# Patient Record
Sex: FEMALE | Race: WHITE | NOT HISPANIC OR LATINO | Employment: OTHER | URBAN - METROPOLITAN AREA
[De-identification: names, ages, dates, MRNs, and addresses within clinical notes are randomized per-mention and may not be internally consistent; named-entity substitution may affect disease eponyms.]

---

## 2023-07-11 ENCOUNTER — NURSING HOME VISIT (OUTPATIENT)
Dept: POST ACUTE CARE | Facility: EXTERNAL LOCATION | Age: 88
End: 2023-07-11
Payer: MEDICARE

## 2023-07-11 DIAGNOSIS — N17.9 AKI (ACUTE KIDNEY INJURY) (CMS-HCC): Primary | ICD-10-CM

## 2023-07-11 DIAGNOSIS — D64.9 ANEMIA, UNSPECIFIED TYPE: ICD-10-CM

## 2023-07-11 DIAGNOSIS — R65.20 SEPSIS WITH METABOLIC ENCEPHALOPATHY (MULTI): ICD-10-CM

## 2023-07-11 DIAGNOSIS — A41.9 SEPSIS WITH METABOLIC ENCEPHALOPATHY (MULTI): ICD-10-CM

## 2023-07-11 DIAGNOSIS — L03.90 CELLULITIS, UNSPECIFIED CELLULITIS SITE: ICD-10-CM

## 2023-07-11 DIAGNOSIS — K92.2 GASTROINTESTINAL HEMORRHAGE, UNSPECIFIED GASTROINTESTINAL HEMORRHAGE TYPE: ICD-10-CM

## 2023-07-11 DIAGNOSIS — G93.41 SEPSIS WITH METABOLIC ENCEPHALOPATHY (MULTI): ICD-10-CM

## 2023-07-11 PROCEDURE — 99497 ADVNCD CARE PLAN 30 MIN: CPT | Performed by: EMERGENCY MEDICINE

## 2023-07-11 PROCEDURE — 99305 1ST NF CARE MODERATE MDM 35: CPT | Performed by: EMERGENCY MEDICINE

## 2023-07-11 NOTE — LETTER
Patient: Yana Gasca  : 1934    Encounter Date: 2023    Yana Gasca   88 y.o.  female  @location@            Assessment and Plan:  History and physical  Acute kidney injury - PYW52-AL N17.9, Medical.     Anemia - PTT42-JW D64.9, Medical.     Cancer - HUG69-QB C80.1, Medical.     Cellulitis of left lower extremity - EPC03-UT L03.116, Medical.     Decreased pedal pulses - LFX33-DQ R09.89, Medical.     GI bleed - FLB43-DC K92.2, Medical.     Hypertension - NCC04-EQ I10, Medical.     IBS - IYU22-AB K58.9, Patient/Family Stated.     Lung nodule - WIR64-JK R91.1, Medical.     Septic shock - IEX88-PL R65.21, Medical.     Severe sepsis - MLK38-VZ R65.20, Medical.       Medications (11) Active  acetaminophen-oxycodone 325 mg-5 mg oral tablet = Percocet 1 tabs, PRN, ORAL, [id number]  calcium (as calcium citrate) 250 mg oral tablet 500 mg = 2 tabs, ORAL, BID  DAPTOmycin 350 mg intravenous injection See Instructions  lactobacillus acidophilus = Floranex, Florajen, Lactinex Granules 1 caps, ORAL, DAILY  Lipitor 10 mg oral tablet 10 mg = 1 tabs, ORAL, DAILY  Pepcid 20 mg oral tablet 20 mg = 1 tabs, PRN, ORAL, DAILY  Plaquenil 200 mg oral tablet 300 mg = 1.5 tabs, ORAL, DAILY  Synthroid 112 mcg (0.112 mg) oral tablet 112 mcg = 1 tabs, ORAL, DAILY  Vitamin D3 125 mcg (5000 intl units) oral tablet 125 mcg = 1 tabs, ORAL, DAILY  warfarin 2 mg oral tablet 2 mg = 1 tabs, ORAL, QHS  Zosyn 2 g-0.25 g/50 mL intravenous solution 2.25 g, IV, [id number]    I discussed advanced care planning including the explanation and discussion of advanced directives. If patient does not have current up to date documents, examples and information provided on how to create both living will and power of . Patient was encouraged to work on completing these documents.  Information and advise was also provided on DO NOT RESUSCITATE and patient encouraged to consider this  Patient is not sure about DNR at this time    Source of history:  Nurse, Medical personnel, Medical record, Patient.  History limitation: None.    HPI:  History and physical    Patient is unable to give any detailed history and therefore history is obtained from the chart  No acute complaints voiced by the patient or acute concerns raised by nursing    History of hospitalization-    CHIEF COMPLAINT:  Chronic back pain.    HISTORY OF PRESENT ILLNESS:  This is an 88-year-old white female with  past medical history of thyroid cancer who now has hypothyroidism,  hypertension, chronic back pain with TENS unit, paroxysmal atrial  fibrillation on Coumadin, hyperlipidemia, came to the emergency room  from an assisted living facility with complaint of back pain.  She was  found to have a large wound of her left lower extremity which she states  used to be a blister and popped over the last couple of days.  The  blister started a few weeks ago.  She is unsure where she got the wound.  From her leg is red and hard to the touch.  She does complain of pain in  bilateral lower extremities.  Right leg is wrapped in Ace wrap.  She  states she does not have any wounds or warmth, redness of that lower  extremity.  She does have a history of DVT several years ago.  She was  hypotensive in the emergency department, was given fluids, remained  hypotensive, and was started on Levophed.  She was also given broad-  spectrum antibiotics, and blood cultures were drawn.  She appears to  have a slight bump in creatinine, and she denies any history of kidney  disease.  She also denies any chest pain or abdominal pain or any  changes in her urine.  She is incontinent and does wear briefs.  She  also denies fever, chills, or muscle aches and pains.  Denies numbness  or tingling in either extremities.  States that her left lower extremity  hurts worse than her back pain.  Also, her stools have been dark over  the last several days, and she thought that was secondary to eating  large amounts of chocolate.  She is  not taking iron at home.  Denies any  alcohol use.  Takes Coumadin and naproxen intermittently.    ALLERGIES:  NO KNOWN ALLERGIES.    No current outpatient medications on file.     No current facility-administered medications for this visit.       Physical Exam:  Vital signs as per nursing/MA documentation were reviewed  General appearance: Alert and in no acute distress  HEENT: Normal Inspection  Neck - Normal Inspection  Respiratory : No respiratory distress. Lungs are clear   Cardiovascular: heart rate normal. No gallop  Back - normal inspection  Skin inspection:Warm  Musculoskeletal : No deformities  Neuro : Limited exam. Baseline    ROS: Review of symptoms is negative except for what is mentioned in HPI    Results/Data  Records including but not limited to electronic medical records, relevant lab work and imaging from patient's health care facility encounter were reviewed and independently verified      Charting was completed using voice recognition technology and may include unintended errors.    Discussed with patient/family, RN, and NP.      Electronically Signed By: Marcin Hurd MD   7/13/23  4:15 PM

## 2023-07-13 NOTE — PROGRESS NOTES
Yana CHRISTIANSON Campos   88 y.o.  female  @location@            Assessment and Plan:  History and physical  Acute kidney injury - GDC93-US N17.9, Medical.     Anemia - WIL26-KH D64.9, Medical.     Cancer - MNP87-BY C80.1, Medical.     Cellulitis of left lower extremity - SBV22-NK L03.116, Medical.     Decreased pedal pulses - VDI15-PT R09.89, Medical.     GI bleed - WTC57-DL K92.2, Medical.     Hypertension - ADF12-TZ I10, Medical.     IBS - JND89-QL K58.9, Patient/Family Stated.     Lung nodule - VPZ03-GK R91.1, Medical.     Septic shock - YIG95-TD R65.21, Medical.     Severe sepsis - TOE97-KH R65.20, Medical.       Medications (11) Active  acetaminophen-oxycodone 325 mg-5 mg oral tablet = Percocet 1 tabs, PRN, ORAL, [id number]  calcium (as calcium citrate) 250 mg oral tablet 500 mg = 2 tabs, ORAL, BID  DAPTOmycin 350 mg intravenous injection See Instructions  lactobacillus acidophilus = Floranex, Florajen, Lactinex Granules 1 caps, ORAL, DAILY  Lipitor 10 mg oral tablet 10 mg = 1 tabs, ORAL, DAILY  Pepcid 20 mg oral tablet 20 mg = 1 tabs, PRN, ORAL, DAILY  Plaquenil 200 mg oral tablet 300 mg = 1.5 tabs, ORAL, DAILY  Synthroid 112 mcg (0.112 mg) oral tablet 112 mcg = 1 tabs, ORAL, DAILY  Vitamin D3 125 mcg (5000 intl units) oral tablet 125 mcg = 1 tabs, ORAL, DAILY  warfarin 2 mg oral tablet 2 mg = 1 tabs, ORAL, QHS  Zosyn 2 g-0.25 g/50 mL intravenous solution 2.25 g, IV, [id number]    I discussed advanced care planning including the explanation and discussion of advanced directives. If patient does not have current up to date documents, examples and information provided on how to create both living will and power of . Patient was encouraged to work on completing these documents.  Information and advise was also provided on DO NOT RESUSCITATE and patient encouraged to consider this  Patient is not sure about DNR at this time    Source of history: Nurse, Medical personnel, Medical record, Patient.  History limitation:  None.    HPI:  History and physical    Patient is unable to give any detailed history and therefore history is obtained from the chart  No acute complaints voiced by the patient or acute concerns raised by nursing    History of hospitalization-    CHIEF COMPLAINT:  Chronic back pain.    HISTORY OF PRESENT ILLNESS:  This is an 88-year-old white female with  past medical history of thyroid cancer who now has hypothyroidism,  hypertension, chronic back pain with TENS unit, paroxysmal atrial  fibrillation on Coumadin, hyperlipidemia, came to the emergency room  from an assisted living facility with complaint of back pain.  She was  found to have a large wound of her left lower extremity which she states  used to be a blister and popped over the last couple of days.  The  blister started a few weeks ago.  She is unsure where she got the wound.  From her leg is red and hard to the touch.  She does complain of pain in  bilateral lower extremities.  Right leg is wrapped in Ace wrap.  She  states she does not have any wounds or warmth, redness of that lower  extremity.  She does have a history of DVT several years ago.  She was  hypotensive in the emergency department, was given fluids, remained  hypotensive, and was started on Levophed.  She was also given broad-  spectrum antibiotics, and blood cultures were drawn.  She appears to  have a slight bump in creatinine, and she denies any history of kidney  disease.  She also denies any chest pain or abdominal pain or any  changes in her urine.  She is incontinent and does wear briefs.  She  also denies fever, chills, or muscle aches and pains.  Denies numbness  or tingling in either extremities.  States that her left lower extremity  hurts worse than her back pain.  Also, her stools have been dark over  the last several days, and she thought that was secondary to eating  large amounts of chocolate.  She is not taking iron at home.  Denies any  alcohol use.  Takes Coumadin and  naproxen intermittently.    ALLERGIES:  NO KNOWN ALLERGIES.    No current outpatient medications on file.     No current facility-administered medications for this visit.       Physical Exam:  Vital signs as per nursing/MA documentation were reviewed  General appearance: Alert and in no acute distress  HEENT: Normal Inspection  Neck - Normal Inspection  Respiratory : No respiratory distress. Lungs are clear   Cardiovascular: heart rate normal. No gallop  Back - normal inspection  Skin inspection:Warm  Musculoskeletal : No deformities  Neuro : Limited exam. Baseline    ROS: Review of symptoms is negative except for what is mentioned in HPI    Results/Data  Records including but not limited to electronic medical records, relevant lab work and imaging from patient's health care facility encounter were reviewed and independently verified      Charting was completed using voice recognition technology and may include unintended errors.    Discussed with patient/family, RN, and NP.

## 2024-01-20 PROCEDURE — 99233 SBSQ HOSP IP/OBS HIGH 50: CPT | Performed by: INTERNAL MEDICINE

## 2024-01-25 ENCOUNTER — NURSING HOME VISIT (OUTPATIENT)
Dept: POST ACUTE CARE | Facility: EXTERNAL LOCATION | Age: 89
End: 2024-01-25
Payer: MEDICARE

## 2024-01-25 DIAGNOSIS — I11.9 CARDIOMYOPATHY, HYPERTENSIVE, BENIGN, WITHOUT HEART FAILURE (MULTI): ICD-10-CM

## 2024-01-25 DIAGNOSIS — I43 CARDIOMYOPATHY, HYPERTENSIVE, BENIGN, WITHOUT HEART FAILURE (MULTI): ICD-10-CM

## 2024-01-25 DIAGNOSIS — M06.9 RHEUMATOID ARTHRITIS, INVOLVING UNSPECIFIED SITE, UNSPECIFIED WHETHER RHEUMATOID FACTOR PRESENT (MULTI): ICD-10-CM

## 2024-01-25 DIAGNOSIS — K92.2 GASTROINTESTINAL HEMORRHAGE, UNSPECIFIED GASTROINTESTINAL HEMORRHAGE TYPE: Primary | ICD-10-CM

## 2024-01-25 DIAGNOSIS — N18.30 STAGE 3 CHRONIC KIDNEY DISEASE, UNSPECIFIED WHETHER STAGE 3A OR 3B CKD (MULTI): ICD-10-CM

## 2024-01-25 DIAGNOSIS — I48.91 ATRIAL FIBRILLATION, UNSPECIFIED TYPE (MULTI): ICD-10-CM

## 2024-01-25 PROCEDURE — 99497 ADVNCD CARE PLAN 30 MIN: CPT | Performed by: EMERGENCY MEDICINE

## 2024-01-25 PROCEDURE — 99305 1ST NF CARE MODERATE MDM 35: CPT | Performed by: EMERGENCY MEDICINE

## 2024-01-25 NOTE — LETTER
Patient: Yana Gasca  : 1934    Encounter Date: 2024    Yana Gasca   89 y.o.  female  @location@            Assessment and Plan:  History and physical    89-year-old lady    Rectal bleeding  Anticoagulated  CKD  Atrial fibrillation  Hypothyroidism  Hypertension  Hyperlipidemia  Rheumatoid arthritis  Remote history of DVT      Patient underwent EGD with no active bleeding was found  Patient's Coumadin is on hold  INR slowly coming down  PPI  Serial H&H-stable  Continue home meds  GI and DVT prophylaxis     APAP 325MG/OXYCODONE 5MG TAB  2 tabs, ORAL, [id number]  ATORVASTATIN 10MG TAB  10 mg 1 tabs, ORAL, QPM  CALCIUM 200MG TAB  500 mg 2.5 tabs, ORAL, BID  CHOLECALCIFEROL 1000 Intl Unit (25mcg) TAB  125 mcg 5 tabs, ORAL, DAILY  DOCUSATE SODIUM 100MG CAPSULE  100 mg 1 caps, ORAL, BID  HYDROXYCHLOROQUINE 200MG TABLET  300 mg 1.5 tabs, ORAL, DAILY WITH BREAKFAST  LACTOBACILLUS CAP  1 caps, ORAL, BID  LEVOTHYROXINE 112MCG TABLET  112 mcg 1 tabs, ORAL, DAILY BEFORE BREAKFAST  NYSTATIN POWDER 15GM  1 dalton, Topical, BID  PANTOPRAZOLE 40MG INJECTION  40 mg, IV Push, [id number]  POLYETHYLENE GLYCOL 3350- 17 GM PACKET  17 g 1 packets, ORAL, QPM  SODIUM CHLORIDE SYR/VIAL 10ML  3 mL, IV Push, [id number]  TRANDOLAPRIL 1MG TAB  1 mg 1 tabs, ORAL, BID  TRIAD WOUND DRESSING  1 dalton, Topical, BID    I discussed advanced care planning including the explanation and discussion of advanced directives. If patient does not have current up to date documents, examples and information provided on how to create both living will and power of . Patient was encouraged to work on completing these documents.  Information and advise was also provided on DO NOT RESUSCITATE and patient encouraged to consider this  Patient is not sure about DNR at this time.  CODE STATUS is on file with the facility    Source of history: Nurse, Medical personnel, Medical record, Patient.  History limitation: None.    HPI:  History and  physical    Patient is unable to give any detailed history and therefore history is obtained from the chart  No acute complaints voiced by the patient or acute concerns raised by nursing    History of hospitalization-  Patient is an 89-year-old female who presents emergency department with multiple complaints. Patient is currently a resident at assisted living facility. She states that she was sent to the emergency department with complaints of rectal bleeding. Patient states that when she was having a bowel movement she had blood in the toilet bowl. Patient is on Coumadin. Patient denies any abdominal pain or dizziness. She is reportedly a new resident at her assisted living facility. They did also express concerns of infection to her left foot. Patient does have a wound to her distal left great toe and erythema to her left leg. She does not appear to be on antibiotics. She does have a history of DVT.     Problem List/Past Medical History Ongoing Cancer Chronic kidney disease stage 3A. Colon polyps Disorder of lipid metabolism Dry skin Foot pain History of deep vein thrombosis History of malignant neoplasm of thyroid Hypertension Hypertensive disorder Injury of back Joint swelling Lymphedema of bilateral lower limbs Pain of knee region Postprocedural state finding Pressure ulcer of sacral region.. Raynaud's disease Spinal stenosis of lumbar region Stasis dermatitis Superficial injury of back Thrombocytopenic disorder Thyroid condition Procedure/Surgical History   esophagogastroduodenoscopy(EGD). (01/19/2024)   Colonoscopy. (10/17/2016)   Colonoscopy with MAC (None) (08/10/2015)   Peripherally Inserted Central Catheter (PICC) Placement.  Allergies No Known Medication Allergies Social History   Alcohol - Denies Alcohol Use   Domestic Concerns Feels highly stressed:No Emotional Support AvailableYes *Financial Concerns Regarding Hospitalization/DischargeNo Financial ConcernsNo *Requesting to speak to someone regarding  financial concernsNo *Family/Friends available to help:No   Employment/School Other:Has two children, one daughter lives with her   Home/Environment *Living Situation Prior to AdmissionECF/SNF Home equipment:Walker/Cane *Special Services and Community Resources Prior to AdmissionNone *Mobility Assistance Prior to AdmissionPartial assistance *Will patient require additional/new services upon discharge?Yes   Substance Abuse - Denies Substance Abuse   Tobacco Use:Never smoker Family History Colon polyps..: Other.Negative: Other. Gastrointestinal malignancy..: Negative: Other. Inflammatory bowel disease..: Negative: Other.    Physical Exam:  Vital signs as per nursing/MA documentation were reviewed  General appearance: Alert and in no acute distress  HEENT: Normal Inspection  Neck - Normal Inspection  Respiratory : No respiratory distress. Lungs are clear   Cardiovascular: heart rate normal. No gallop  Back - normal inspection  Skin inspection:Warm  Musculoskeletal : No deformities  Neuro : Limited exam. Baseline    ROS: Review of symptoms is negative except for what is mentioned in HPI    Results/Data  Records including but not limited to electronic medical records, relevant lab work and imaging from patient's health care facility encounter were reviewed and independently verified      Charting was completed using voice recognition technology and may include unintended errors.    Discussed with patient/family, RN, and NP.      Electronically Signed By: Marcin Hurd MD   1/30/24  3:53 PM

## 2024-01-30 PROBLEM — I43 CARDIOMYOPATHY, HYPERTENSIVE, BENIGN, WITHOUT HEART FAILURE (MULTI): Status: ACTIVE | Noted: 2024-01-30

## 2024-01-30 PROBLEM — I48.91 ATRIAL FIBRILLATION (MULTI): Status: ACTIVE | Noted: 2024-01-30

## 2024-01-30 PROBLEM — K92.2 GASTROINTESTINAL HEMORRHAGE: Status: ACTIVE | Noted: 2024-01-30

## 2024-01-30 PROBLEM — M06.9 RHEUMATOID ARTHRITIS (MULTI): Status: ACTIVE | Noted: 2024-01-30

## 2024-01-30 PROBLEM — N18.30 STAGE 3 CHRONIC KIDNEY DISEASE (MULTI): Status: ACTIVE | Noted: 2024-01-30

## 2024-01-30 PROBLEM — I11.9 CARDIOMYOPATHY, HYPERTENSIVE, BENIGN, WITHOUT HEART FAILURE (MULTI): Status: ACTIVE | Noted: 2024-01-30

## 2024-01-30 NOTE — PROGRESS NOTES
Yana Gasca   89 y.o.  female  @location@            Assessment and Plan:  History and physical    89-year-old lady    Rectal bleeding  Anticoagulated  CKD  Atrial fibrillation  Hypothyroidism  Hypertension  Hyperlipidemia  Rheumatoid arthritis  Remote history of DVT      Patient underwent EGD with no active bleeding was found  Patient's Coumadin is on hold  INR slowly coming down  PPI  Serial H&H-stable  Continue home meds  GI and DVT prophylaxis     APAP 325MG/OXYCODONE 5MG TAB  2 tabs, ORAL, [id number]  ATORVASTATIN 10MG TAB  10 mg 1 tabs, ORAL, QPM  CALCIUM 200MG TAB  500 mg 2.5 tabs, ORAL, BID  CHOLECALCIFEROL 1000 Intl Unit (25mcg) TAB  125 mcg 5 tabs, ORAL, DAILY  DOCUSATE SODIUM 100MG CAPSULE  100 mg 1 caps, ORAL, BID  HYDROXYCHLOROQUINE 200MG TABLET  300 mg 1.5 tabs, ORAL, DAILY WITH BREAKFAST  LACTOBACILLUS CAP  1 caps, ORAL, BID  LEVOTHYROXINE 112MCG TABLET  112 mcg 1 tabs, ORAL, DAILY BEFORE BREAKFAST  NYSTATIN POWDER 15GM  1 dalton, Topical, BID  PANTOPRAZOLE 40MG INJECTION  40 mg, IV Push, [id number]  POLYETHYLENE GLYCOL 3350- 17 GM PACKET  17 g 1 packets, ORAL, QPM  SODIUM CHLORIDE SYR/VIAL 10ML  3 mL, IV Push, [id number]  TRANDOLAPRIL 1MG TAB  1 mg 1 tabs, ORAL, BID  TRIAD WOUND DRESSING  1 dalton, Topical, BID    I discussed advanced care planning including the explanation and discussion of advanced directives. If patient does not have current up to date documents, examples and information provided on how to create both living will and power of . Patient was encouraged to work on completing these documents.  Information and advise was also provided on DO NOT RESUSCITATE and patient encouraged to consider this  Patient is not sure about DNR at this time.  CODE STATUS is on file with the facility    Source of history: Nurse, Medical personnel, Medical record, Patient.  History limitation: None.    HPI:  History and physical    Patient is unable to give any detailed history and therefore history is  obtained from the chart  No acute complaints voiced by the patient or acute concerns raised by nursing    History of hospitalization-  Patient is an 89-year-old female who presents emergency department with multiple complaints. Patient is currently a resident at assisted living facility. She states that she was sent to the emergency department with complaints of rectal bleeding. Patient states that when she was having a bowel movement she had blood in the toilet bowl. Patient is on Coumadin. Patient denies any abdominal pain or dizziness. She is reportedly a new resident at her assisted living facility. They did also express concerns of infection to her left foot. Patient does have a wound to her distal left great toe and erythema to her left leg. She does not appear to be on antibiotics. She does have a history of DVT.     Problem List/Past Medical History Ongoing Cancer Chronic kidney disease stage 3A. Colon polyps Disorder of lipid metabolism Dry skin Foot pain History of deep vein thrombosis History of malignant neoplasm of thyroid Hypertension Hypertensive disorder Injury of back Joint swelling Lymphedema of bilateral lower limbs Pain of knee region Postprocedural state finding Pressure ulcer of sacral region.. Raynaud's disease Spinal stenosis of lumbar region Stasis dermatitis Superficial injury of back Thrombocytopenic disorder Thyroid condition Procedure/Surgical History   esophagogastroduodenoscopy(EGD). (01/19/2024)   Colonoscopy. (10/17/2016)   Colonoscopy with MAC (None) (08/10/2015)   Peripherally Inserted Central Catheter (PICC) Placement.  Allergies No Known Medication Allergies Social History   Alcohol - Denies Alcohol Use   Domestic Concerns Feels highly stressed:No Emotional Support AvailableYes *Financial Concerns Regarding Hospitalization/DischargeNo Financial ConcernsNo *Requesting to speak to someone regarding financial concernsNo *Family/Friends available to help:No   Employment/School  Other:Has two children, one daughter lives with her   Home/Environment *Living Situation Prior to AdmissionECF/SNF Home equipment:Walker/Cane *Special Services and Community Resources Prior to AdmissionNone *Mobility Assistance Prior to AdmissionPartial assistance *Will patient require additional/new services upon discharge?Yes   Substance Abuse - Denies Substance Abuse   Tobacco Use:Never smoker Family History Colon polyps..: Other.Negative: Other. Gastrointestinal malignancy..: Negative: Other. Inflammatory bowel disease..: Negative: Other.    Physical Exam:  Vital signs as per nursing/MA documentation were reviewed  General appearance: Alert and in no acute distress  HEENT: Normal Inspection  Neck - Normal Inspection  Respiratory : No respiratory distress. Lungs are clear   Cardiovascular: heart rate normal. No gallop  Back - normal inspection  Skin inspection:Warm  Musculoskeletal : No deformities  Neuro : Limited exam. Baseline    ROS: Review of symptoms is negative except for what is mentioned in HPI    Results/Data  Records including but not limited to electronic medical records, relevant lab work and imaging from patient's health care facility encounter were reviewed and independently verified      Charting was completed using voice recognition technology and may include unintended errors.    Discussed with patient/family, RN, and NP.

## 2024-04-02 ENCOUNTER — HOME VISIT (OUTPATIENT)
Dept: POST ACUTE CARE | Facility: EXTERNAL LOCATION | Age: 89
End: 2024-04-02
Payer: MEDICARE

## 2024-04-02 DIAGNOSIS — K92.2 GASTROINTESTINAL HEMORRHAGE, UNSPECIFIED GASTROINTESTINAL HEMORRHAGE TYPE: ICD-10-CM

## 2024-04-02 DIAGNOSIS — N18.30 STAGE 3 CHRONIC KIDNEY DISEASE, UNSPECIFIED WHETHER STAGE 3A OR 3B CKD (MULTI): Primary | ICD-10-CM

## 2024-04-02 DIAGNOSIS — I48.91 ATRIAL FIBRILLATION, UNSPECIFIED TYPE (MULTI): ICD-10-CM

## 2024-04-02 DIAGNOSIS — M06.9 RHEUMATOID ARTHRITIS, INVOLVING UNSPECIFIED SITE, UNSPECIFIED WHETHER RHEUMATOID FACTOR PRESENT (MULTI): ICD-10-CM

## 2024-04-02 PROCEDURE — 99350 HOME/RES VST EST HIGH MDM 60: CPT | Performed by: EMERGENCY MEDICINE

## 2024-04-03 ENCOUNTER — TELEPHONE (OUTPATIENT)
Dept: PRIMARY CARE | Facility: CLINIC | Age: 89
End: 2024-04-03
Payer: MEDICARE

## 2024-04-14 NOTE — PROGRESS NOTES
Yana Gasca   89 y.o.  female  @location@            Assessment and Plan:  History and physical    89-year-old lady    Rectal bleeding  Anticoagulated  CKD  Atrial fibrillation  Hypothyroidism  Hypertension  Hyperlipidemia  Rheumatoid arthritis  Remote history of DVT      Patient underwent EGD with no active bleeding was found  Patient's Coumadin is on hold  INR slowly coming down  PPI  Serial H&H-stable  Continue home meds  GI and DVT prophylaxis     APAP 325MG/OXYCODONE 5MG TAB  2 tabs, ORAL, [id number]  ATORVASTATIN 10MG TAB  10 mg 1 tabs, ORAL, QPM  CALCIUM 200MG TAB  500 mg 2.5 tabs, ORAL, BID  CHOLECALCIFEROL 1000 Intl Unit (25mcg) TAB  125 mcg 5 tabs, ORAL, DAILY  DOCUSATE SODIUM 100MG CAPSULE  100 mg 1 caps, ORAL, BID  HYDROXYCHLOROQUINE 200MG TABLET  300 mg 1.5 tabs, ORAL, DAILY WITH BREAKFAST  LACTOBACILLUS CAP  1 caps, ORAL, BID  LEVOTHYROXINE 112MCG TABLET  112 mcg 1 tabs, ORAL, DAILY BEFORE BREAKFAST  NYSTATIN POWDER 15GM  1 dalton, Topical, BID  PANTOPRAZOLE 40MG INJECTION  40 mg, IV Push, [id number]  POLYETHYLENE GLYCOL 3350- 17 GM PACKET  17 g 1 packets, ORAL, QPM  SODIUM CHLORIDE SYR/VIAL 10ML  3 mL, IV Push, [id number]  TRANDOLAPRIL 1MG TAB  1 mg 1 tabs, ORAL, BID  TRIAD WOUND DRESSING  1 dalton, Topical, BID    -Fall prevention    -Cognitive engagement     -Monitor and treat blood pressure     -Aggressive decubitus ulcer prevention.     -Bowel and bladder care     -Optimal nutrition and supplementation as needed     -GI  and DVT prophylaxis     -Symptom control     -Ambulation as tolerated     -Will follow    Charting is done using voice recognition software and may contain errors which have not been completely corrected      Source of history: Nurse, Medical personnel, Medical record, Patient.  History limitation: None.    HPI:  History and physical    Patient is unable to give any detailed history and therefore history is obtained from the chart  No acute complaints voiced by the patient or acute  concerns raised by nursing    History of prior hospitalization-  Patient is an 89-year-old female who presents emergency department with multiple complaints. Patient is currently a resident at assisted living facility. She states that she was sent to the emergency department with complaints of rectal bleeding. Patient states that when she was having a bowel movement she had blood in the toilet bowl. Patient is on Coumadin. Patient denies any abdominal pain or dizziness. She is reportedly a new resident at her assisted living facility. They did also express concerns of infection to her left foot. Patient does have a wound to her distal left great toe and erythema to her left leg. She does not appear to be on antibiotics. She does have a history of DVT.     Problem List/Past Medical History Ongoing Cancer Chronic kidney disease stage 3A. Colon polyps Disorder of lipid metabolism Dry skin Foot pain History of deep vein thrombosis History of malignant neoplasm of thyroid Hypertension Hypertensive disorder Injury of back Joint swelling Lymphedema of bilateral lower limbs Pain of knee region Postprocedural state finding Pressure ulcer of sacral region.. Raynaud's disease Spinal stenosis of lumbar region Stasis dermatitis Superficial injury of back Thrombocytopenic disorder Thyroid condition Procedure/Surgical History   esophagogastroduodenoscopy(EGD). (01/19/2024)   Colonoscopy. (10/17/2016)   Colonoscopy with MAC (None) (08/10/2015)   Peripherally Inserted Central Catheter (PICC) Placement.  Allergies No Known Medication Allergies Social History   Alcohol - Denies Alcohol Use   Domestic Concerns Feels highly stressed:No Emotional Support AvailableYes *Financial Concerns Regarding Hospitalization/DischargeNo Financial ConcernsNo *Requesting to speak to someone regarding financial concernsNo *Family/Friends available to help:No   Employment/School Other:Has two children, one daughter lives with her   Home/Environment  *Living Situation [facility] to [facility]mission[facility] Home equipment:Walker/Cane *Special Services and Community Resources Prior to AdmissionNone *Mobility Assistance Prior to AdmissionPartial assistance *Will patient require additional/new services upon discharge?Yes   Substance Abuse - Denies Substance Abuse   Tobacco Use:Never smoker Family History Colon polyps..: Other.Negative: Other. Gastrointestinal malignancy..: Negative: Other. Inflammatory bowel disease..: Negative: Other.    Physical Exam:  Vital signs as per nursing/MA documentation were reviewed  General appearance: Alert and in no acute distress  HEENT: Normal Inspection  Neck - Normal Inspection  Respiratory : No respiratory distress. Lungs are clear   Cardiovascular: heart rate normal. No gallop  Back - normal inspection  Skin inspection:Warm  Musculoskeletal : No deformities  Neuro : Limited exam. Baseline    ROS: Review of symptoms is negative except for what is mentioned in HPI    Results/Data  Records including but not limited to electronic medical records, relevant lab work and imaging from patient's health care facility encounter were reviewed and independently verified      Charting was completed using voice recognition technology and may include unintended errors.    Discussed with patient/family, RN, and NP.